# Patient Record
Sex: FEMALE | Race: WHITE | NOT HISPANIC OR LATINO | Employment: OTHER | URBAN - METROPOLITAN AREA
[De-identification: names, ages, dates, MRNs, and addresses within clinical notes are randomized per-mention and may not be internally consistent; named-entity substitution may affect disease eponyms.]

---

## 2017-03-07 ENCOUNTER — ALLSCRIPTS OFFICE VISIT (OUTPATIENT)
Dept: OTHER | Facility: OTHER | Age: 52
End: 2017-03-07

## 2017-03-13 ENCOUNTER — GENERIC CONVERSION - ENCOUNTER (OUTPATIENT)
Dept: OTHER | Facility: OTHER | Age: 52
End: 2017-03-13

## 2017-03-14 LAB — PLEASE NOTE (HISTORICAL): NORMAL

## 2017-03-15 LAB — FECAL OCCULT BLOOD DIAGNOSTIC (HISTORICAL): NEGATIVE

## 2017-03-16 ENCOUNTER — GENERIC CONVERSION - ENCOUNTER (OUTPATIENT)
Dept: OTHER | Facility: OTHER | Age: 52
End: 2017-03-16

## 2017-03-16 DIAGNOSIS — Z12.31 ENCOUNTER FOR SCREENING MAMMOGRAM FOR MALIGNANT NEOPLASM OF BREAST: ICD-10-CM

## 2017-03-16 LAB — FECAL OCCULT BLOOD DIAGNOSTIC (HISTORICAL): NEGATIVE

## 2017-07-05 ENCOUNTER — ALLSCRIPTS OFFICE VISIT (OUTPATIENT)
Dept: OTHER | Facility: OTHER | Age: 52
End: 2017-07-05

## 2017-07-07 LAB
LYME 18 KD IGG (HISTORICAL): PRESENT
LYME 23 KD IGG (HISTORICAL): ABNORMAL
LYME 23 KD IGM (HISTORICAL): PRESENT
LYME 28 KD IGG (HISTORICAL): PRESENT
LYME 30 KD IGG (HISTORICAL): PRESENT
LYME 39 KD IGG (HISTORICAL): PRESENT
LYME 39 KD IGM (HISTORICAL): ABNORMAL
LYME 41 KD IGG (HISTORICAL): PRESENT
LYME 41 KD IGM (HISTORICAL): ABNORMAL
LYME 45 KD IGG (HISTORICAL): PRESENT
LYME 58 KD IGG (HISTORICAL): PRESENT
LYME 66 KD IGG (HISTORICAL): PRESENT
LYME 93 KD IGG (HISTORICAL): PRESENT
LYME IGG WB INTERP. (HISTORICAL): POSITIVE
LYME IGG/IGM AB (HISTORICAL): 2.55 ISR (ref 0–0.9)
LYME IGM (HISTORICAL): <0.8 INDEX (ref 0–0.79)
LYME IGM WB INTERP. (HISTORICAL): NEGATIVE

## 2017-11-06 LAB
CHOLEST SERPL-MCNC: 248 MG/DL (ref 100–199)
CHOLEST/HDLC SERPL: 6.4 RATIO UNITS (ref 0–4.4)
HDLC SERPL-MCNC: 39 MG/DL
LDLC SERPL CALC-MCNC: 149 MG/DL (ref 0–99)
TRIGL SERPL-MCNC: 300 MG/DL (ref 0–149)
VLDLC SERPL CALC-MCNC: 60 MG/DL (ref 5–40)

## 2017-11-13 ENCOUNTER — ALLSCRIPTS OFFICE VISIT (OUTPATIENT)
Dept: OTHER | Facility: OTHER | Age: 52
End: 2017-11-13

## 2017-11-14 NOTE — PROGRESS NOTES
Assessment    1  Hyperlipidemia (272 4) (E78 5)   2  Hypertension, essential (401 9) (I10)   3  Acute URI (465 9) (J06 9)    Plan  Acute URI    · PredniSONE 10 MG Oral Tablet; TAKE 4 TABLETS DAILY FOR 2 DAYS,3 TABLETSDAILY FOR 2 DAYS, 2 TABLETS DAILY FOR 2 DAYS AND 1 TABLET DAILY FOR 2DAYS, THEN STOP  Hypertension, essential    · (1) CBC/PLT/DIFF; Status:Active; Requested for:13Apr2018;    · (1) COMPREHENSIVE METABOLIC PANEL; Status:Active; Requested for:13Apr2018;    · (1) LIPID PANEL, FASTING; Status:Active; Requested for:13Apr2018;    · (1) TSH; Status:Active; Requested for:13Apr2018;    A/p  1   elevated blood pressure :  great with the weight loss  this remains great with out meds  2  hyperlipidemia: please watch your diet! will milton this again in 6 months  colestoff and diet  LDL better but now trigs are elevted  please try to find some balance  3  URI: please take the prednisone and increase fluids  call if this is not improving  rto 6 months/ labs prior  flu and ekg today  COLON DUE - 03/2018 zbigniew due - 3/15/2017   has script and will get today pap due - 03/2018         rto in 6 months with labs prior   **cs and lab slip   (LC) Occult Blood, Fecal, IA; Status:Active - Retrospective By Protocol Authorization; Requested for:07Mar2017;  Perform:[facility]; MAKENNA:03DMV1611; Last Updated Andrea Melendez; 3/7/2017 8:56:34 AM;Ordered; For:Colon cancer screening; Ordered By:Birney, Arna Simmonds;  PEAK FLOW- POC; Status:Resulted - Requires Verification,Retrospective By Protocol Authorization;   Done: 04WTI7364 12:00AM Due:10Mar2017; Last Updated Andrea Melendez; 3/7/2017 8:56:31 AM;Ordered; Today; For:Asthma; Ordered By:Birney, Arna Simmonds;   Chief Complaint  pt  presents in the office today for a 6 month recheck for her asthma, sleep apnea, and HTN  DUE - 03/2018due - 3/15/2017due - 03/2018      History of Present Illness  Here today to milton on several chronic issues  not checking he BP when she is not here and is not taking any meds for thisgot back from [facility] on vacation and got her labs done the day she returned   was eating out of control; while she was there  BEEN DEALING WITH A LOT OF SINUS PRESSURE AND CONGESTION AND R EAR PAIN AND PRESSURE  HAS A COUGH THAT IS WORSE AT NIGHT  NO FEVER AND IS NOT TREATING This  HAS BEEN GOING ON FOR AT LEAST 2 WEEKS  USING THE TREADMILL  Review of Systems   Constitutional: No fever, no chills, feels well, no tiredness, no recent weight gain or weight loss  ENT: as noted in HPI  Cardiovascular: No complaints of slow heart rate, no fast heart rate, no chest pain, no palpitations, no leg claudication, no lower extremity edema  Respiratory: No complaints of shortness of breath, no wheezing, no cough, no SOB on exertion, no orthopnea, no PND  Gastrointestinal: No complaints of abdominal pain, no constipation, no nausea or vomiting, no diarrhea, no bloody stools  Genitourinary: No complaints of dysuria, no incontinence, no pelvic pain, no dysmenorrhea, no vaginal discharge or bleeding  Active Problems  1  Allergic rhinitis (477 9) (J30 9)   2  Asthma (493 90) (J45 909)   3  Colon cancer screening (V76 51) (Z12 11)   4  History of allergy (V15 09) (Z88 9)   5  Hyperlipidemia (272 4) (E78 5)   6  Hypertension, essential (401 9) (I10)   7  Sleep apnea (780 57) (G47 30)    Past Medical History  1  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)    Surgical History  1  History of  Section   2  History of Excision Of Neuroma Of Right Thumb   3  History of Hand Incision Tendon Sheath Of A Finger   4  History of Neuroplasty Median Nerve At Carpal Tunnel    Family History  Mother    1  Denied: Family history of drug abuse   2  Family history of malignant neoplasm of cervix (V16 49) (Z80 49)   3  Denied: Family history of Mental health problem   4  Family history of Mother  At Age ___  Father    11  Denied: Family history of drug abuse   6   Denied: Family history of Mental health problem  Aunt    7  Family history of cerebral aneurysm (V17 1) (Z82 49)  Family History    8  Family history of Adopted child    The family history was reviewed and updated today  Social History     · Alcohol use (V49 89) (Z78 9)   · Denied: History of Alcohol Use (History)   · Denied: History of Drug Use   · Former smoker (V15 82) (Z68 467)   · Denied: History of Never A Smoker   · No caffeine use   · Rarely consumes alcohol (V49 89) (Z78 9)   · Uses Safety Equipment - Seatbelts  The social history was reviewed and updated today  The social history was reviewed and is unchanged  Current Meds   1  Cholest Off Complete TABS; TAKE AS DIRECTED; Therapy: (Recorded:18Apr2016) to Recorded   2  Flovent  MCG/ACT Inhalation Aerosol; INHALE 2 PUFFS TWICE DAILY  RINSE MOUTH AFTER USE; Therapy: 98RNL1386 to (Last Rx:21Jan2016)  Requested for: 21Jan2016 Ordered   3  Green Tea Extract CAPS; TAKE 2 CAPSULE Daily; Therapy: ([phone]) to Recorded   4  Gummi Bear Multivitamin/Min CHEW; take 2 tablet daily; Therapy: (Recorded:39Qxw5925) to Recorded   5  LORazepam 0 5 MG Oral Tablet; TAKE 1 TO 2 TABLETS UP TO 3 TIMES A DAY AS NEEDED; Therapy: 51MAT1946 to [phone])  Requested for: 72VXZ6296; Last Rx:19Oct2017 Ordered   6  Nasonex 50 MCG/ACT Nasal Suspension; USE 2 SPRAYS IN EACH NOSTRIL ONCE DAILY after bathing  Requested for: 62Aqz3356; Last Rx:20May2015 Ordered   7  Ocean Nasal Spray 0 65 % Nasal Solution; Irrigate your nose with 2 sprays twice a day; Therapy: 67JVZ4240 to (Last Rx:20May2015) Ordered   8  ProAir  (90 Base) MCG/ACT Inhalation Aerosol Solution; USE 2 PUFFS AS NEEDED FOR A RESCUE INHALER ONLY, NO MORE THAN 4 TIMESA DAY; Therapy: 39GKD0633 to (Evaluate:22Nov2017)  Requested for: 09TAA2687; Last Rx:19Oct2017 Ordered   9  Slow Magnesium/Calcium  MG TBEC; Therapy: (Recorded:18Apr2016) to Recorded    Allergies  1  No Known Drug Allergies  2  Grass   3  Other   4  Trees    Vitals  Vital Signs    Recorded: 83RVK8447 08:14AM   Heart Rate 80   Respiration 16   Systolic 684   Diastolic 80   Height 5 ft 1 5 in   Weight 178 lb    BMI Calculated 33 09   BSA Calculated 1 81       Physical Exam   Constitutional  General appearance: No acute distress, well appearing and well nourished  Ears, Nose, Mouth, and Throat  Otoscopic examination: Abnormal  -- R tm with kailyn fluid  L tm is clear  Nasal mucosa, septum, and turbinates: Abnormal  -- boggy with serous  Oropharynx: Normal with no erythema, edema, exudate or lesions  Pulmonary  Auscultation of lungs: Clear to auscultation  Cardiovascular  Auscultation of heart: Normal rate and rhythm, normal S1 and S2, without murmurs  Carotid pulses: Normal    Lymphatic  Palpation of lymph nodes in neck: No lymphadenopathy  Skin  Skin and subcutaneous tissue: Normal without rashes or lesions  Psychiatric  Orientation to person, place, and time: Normal    Mood and affect: Normal          Health Management  Colon cancer screening   (LC) Occult Blood, Fecal, IA; every 1 year; Last 29IJP5119; Next Due: 61EPU0375; Active  History of Encounter for routine gynecological examination   (1) THIN PREP PAP WITH IMAGING; every 2 years; Last 87EHL8417; Next Due: 09GLT0152; Active  History of screening mammography   * MAMMO SCREENING BILATERAL W CAD; every 1 year; Last 63GYN8326; Next Due: 63UJJ4739; Overdue    Signatures   Electronically signed by : Aurea Morin; Nov 13 2017  8:45AM EST                       (Author)    Electronically signed by : Angella Mccurdy DO; Nov 13 2017  8:54PM EST

## 2017-11-21 ENCOUNTER — HOSPITAL ENCOUNTER (OUTPATIENT)
Dept: MAMMOGRAPHY | Facility: CLINIC | Age: 52
Discharge: HOME | End: 2017-11-21
Payer: COMMERCIAL

## 2017-11-21 DIAGNOSIS — Z12.31 ENCOUNTER FOR SCREENING MAMMOGRAM FOR MALIGNANT NEOPLASM OF BREAST: ICD-10-CM

## 2017-11-21 PROCEDURE — G0202 SCR MAMMO BI INCL CAD: HCPCS

## 2017-11-21 PROCEDURE — 77063 BREAST TOMOSYNTHESIS BI: CPT

## 2017-12-01 ENCOUNTER — GENERIC CONVERSION - ENCOUNTER (OUTPATIENT)
Dept: OTHER | Facility: OTHER | Age: 52
End: 2017-12-01

## 2017-12-01 ENCOUNTER — ALLSCRIPTS OFFICE VISIT (OUTPATIENT)
Dept: OTHER | Facility: OTHER | Age: 52
End: 2017-12-01

## 2017-12-01 LAB
BILIRUB UR QL STRIP: NORMAL
CLARITY UR: NORMAL
COLOR UR: YELLOW
GLUCOSE (HISTORICAL): NORMAL
HGB UR QL STRIP.AUTO: NORMAL
KETONES UR STRIP-MCNC: NORMAL MG/DL
LEUKOCYTE ESTERASE UR QL STRIP: NORMAL
NITRITE UR QL STRIP: NORMAL
PH UR STRIP.AUTO: 5 [PH]
PROT UR STRIP-MCNC: NORMAL MG/DL
SP GR UR STRIP.AUTO: 1.03
UROBILINOGEN UR QL STRIP.AUTO: 0.2

## 2017-12-05 LAB
CULTURE RESULT (HISTORICAL): ABNORMAL
MISCELLANEOUS LAB TEST RESULT (HISTORICAL): ABNORMAL

## 2018-01-12 VITALS
RESPIRATION RATE: 16 BRPM | DIASTOLIC BLOOD PRESSURE: 80 MMHG | WEIGHT: 178 LBS | HEIGHT: 62 IN | HEART RATE: 80 BPM | SYSTOLIC BLOOD PRESSURE: 124 MMHG | BODY MASS INDEX: 32.76 KG/M2

## 2018-01-12 NOTE — PROGRESS NOTES
Assessment    1  Former smoker (V15 82) (M06 919)   · quit in 1986   2  Asthma (493 90) (J45 909)   3  Premenstrual tension syndrome (625 4) (N94 3)   4  Blood pressure elevated (796 2) (I10)   5  Hyperlipidemia (272 4) (E78 5)   6  Sleep apnea (780 57) (G47 30)   7  Colon cancer screening (V76 51) (Z12 11)   8  Cervical strain (847 0) (S16 1XXA)    Plan  Asthma    · Flovent  MCG/ACT Inhalation Aerosol; INHALE 2 PUFFS TWICE DAILY  RINSE MOUTH AFTER USE   · ProAir  (90 Base) MCG/ACT Inhalation Aerosol Solution; Inhale 2 puffs  as needed for a rescue inhaler only  No more than 4 times daily   · PEAK FLOW- POC; Status:Complete - Retrospective By Protocol Authorization;   Done:  73MJR6919 01:34PM  Cervical strain    · *1 - [facility]  Consult  Status: Hold For -  Scheduling  Requested for: 05GMR4316  Care Summary provided  : Yes  Hyperlipidemia    · Atorvastatin Calcium 10 MG Oral Tablet (Lipitor); TAKE 1 TABLET AT BEDTIME   · (1) COMPREHENSIVE METABOLIC PANEL; Status:Active; Requested for:94Gjr8893;    · (1) LIPID PANEL, FASTING; Status:Active; Requested for:27Dfu2596;    · 1 - Jaiden WESTON, Ioana Ku  (Gastroenterology) Physician Referral  Consult  Status: Active   Requested for: 83YQB2175  Care Summary provided  : Yes  Need for prophylactic vaccination and inoculation against influenza    · Fluzone Quadrivalent Intramuscular Suspension  Premenstrual tension syndrome    · LORazepam 0 5 MG Oral Tablet; TAKE 1 TO 2 TABLETS BY MOUTH UP TO 3  TIMES A DAY AS NEEDED FOR PMS  Sleep apnea    · 2 - Melisa Franks MD  (Sleep Specialist) Physician Referral  Consult  Status: Hold For -  Scheduling  Requested for: 80HWQ3360  Care Summary provided  : Yes                #1 for asthma we will resume the flovent because of costs   we will milton this in 3 months    #2 for PMS she will take the Ativan as she needs it and see if this helps bring her blood pressure down    #3 for elevated blood pressure :  please continue checking your bp at home  #4 for her really high cholesterol We will try the  lipitor and have you take this once a day  please call if you have any questions about you feel with this  we will milton the blood work again in 3 months    5  cervical strain: Please start yoga and please do the physical therapy  Next mammogram due February 12, 2016 her later  Next Pap test due February 2016  colon info given    3 month milton with labs prior      Chief Complaint  Pt presents to the office today for her 3 mo recheck on asthma, PMS, elevated BP and cholesterol  B/W DONE 12/15/15  COLON DUE NOW  MAMMO 02/12/16  PAP 02/11/16      History of Present Illness  Here today to milton on several chronic issues  Had been started on Simvastatin last ov and states that she did not eel good on this   can not really articulate what she was feeling ( denies myalgia)  Stopped it shortly after starting it and is now taking the cholestoff   states that she does have issues with snoring and witnessed apnea  Does check her BP at home and is getting good readings over all  Ativan is working well for her PMS which is less frequently as as well    Gets neck stiffness and pain and L shoulder gets numb on occasion  Review of Systems    Constitutional: No fever, no chills, feels well, no tiredness, no recent weight gain or weight loss  Cardiovascular: No complaints of slow heart rate, no fast heart rate, no chest pain, no palpitations, no leg claudication, no lower extremity edema  Respiratory: No complaints of shortness of breath, no wheezing, no cough, no SOB on exertion, no orthopnea, no PND  Musculoskeletal: as noted in HPI  Integumentary: No complaints of skin rash or lesions, no itching, no skin wounds, no breast pain or lump     Neurological: No complaints of headache, no confusion, no convulsions, no numbness, no dizziness or fainting, no tingling, no limb weakness, no difficulty walking  Psychiatric: Not suicidal, no sleep disturbance, no anxiety or depression, no change in personality, no emotional problems  Active Problems    1  Allergic rhinitis (477 9) (J30 9)   2  Asthma (493 90) (J45 909)   3  Blood pressure elevated (796 2) (I10)   4  Colon cancer screening (V76 51) (Z12 11)   5  Encounter for routine gynecological examination (V72 31) (Z01 419)   6  Encounter for screening mammogram for malignant neoplasm of breast (V76 12)   (Z12 31)   7  Encounter for therapeutic drug monitoring (V58 83) (Z51 81)   8  History of allergy (V15 09) (Z88 9)   9  Hyperlipidemia (272 4) (E78 5)   10  Long term use of drug (V58 69) (Z79 899)   11  Need for prophylactic vaccination and inoculation against influenza (V04 81) (Z23)   12  Premenstrual tension syndrome (625 4) (N94 3)    Past Medical History    1  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)    Family History    1  Family history of malignant neoplasm of cervix (V16 49) (Z80 49)   2  Family history of Mother  At Age ___    3  Family history of Adopted child    Social History    · Alcohol use (V49 89) (F10 99)   · Denied: History of Alcohol Use (History)   · Daily caffeine consumption, 2-3 servings a day   · Denied: History of Drug Use   · Former smoker (L75 35) (J97 916)   · Denied: History of Never A Smoker   · Rarely consumes alcohol (V49 89) (Z78 9)   · Uses Safety Equipment - Seatbelts    Current Meds   1  Blood Pressure Monitor Device; Check blood pressure at different times daily, once daily; Therapy: 26WHE4425 to (Last Rx:03Zkv9827) Ordered   2  Gummi Bear Multivitamin/Min CHEW; take 2 tablet daily; Therapy: (Recorded:48Hcg4150) to Recorded   3  LORazepam 0 5 MG Oral Tablet; TAKE 1 TO 2 TABLETS BY MOUTH UP TO 3 TIMES A   DAY AS NEEDED FOR PMS; Therapy: 45YAK9613 to ([id number])  Requested for: 67JSQ0981; Last   Rx:11Bcq6732 Ordered   4   Nasonex 50 MCG/ACT Nasal Suspension; USE 2 SPRAYS IN EACH NOSTRIL ONCE   DAILY after bathing  Requested for: 16Eds8003; Last Rx:20May2015 Ordered   5  Ocean Nasal Spray 0 65 % Nasal Solution; Irrigate your nose with 2 sprays twice a day; Therapy: 47SMN2362 to (Last Rx:20May2015) Ordered   6  ProAir  (90 Base) MCG/ACT Inhalation Aerosol Solution; Inhale 2 puffs as   needed for a rescue inhaler only  No more than 4 times daily  Requested for:   97JRO2513; Last Rx:20May2015 Ordered    Allergies    1  No Known Drug Allergies    2  Grass   3  Other   4  Trees    Vitals  Vital Signs [Data Includes: Current Encounter]    Recorded: Y4105077 01:33PM   Heart Rate 76, R Radial   Respiration 16   Respiration Quality Norm   Systolic 675, RUE, Sitting   Diastolic 76, RUE, Sitting   Height 5 ft 1 5 in   Weight 186 lb 7 04 oz   BMI Calculated 34 66   BSA Calculated 1 84     Physical Exam    Constitutional   General appearance: No acute distress, well appearing and well nourished  Pulmonary   Auscultation of lungs: Clear to auscultation  Cardiovascular   Auscultation of heart: Normal rate and rhythm, normal S1 and S2, without murmurs  Carotid pulses: Normal     Lymphatic   Palpation of lymph nodes in neck: No lymphadenopathy  Musculoskeletal   Gait and station: Normal     Inspection/palpation of joints, bones, and muscles: Abnormal   decreased rom cervical spine due to general stiffness  pain to palpation over the L trap area  Skin   Skin and subcutaneous tissue: Normal without rashes or lesions      Psychiatric   Orientation to person, place, and time: Normal     Mood and affect: Normal          Results/Data  Encounter Results   PHQ-2 Adult Depression Screening 04UMW3827 01:35PM User, Chari     Test Name Result Flag Reference   PHQ-2 Adult Depression Score 0     Q1: 0, Q2: 0   PHQ-2 Adult Depression Screening Negative       PEAK FLOW- POC 86VNQ1932 01:34PM Marilyn Gallagher     Test Name Result Flag Reference   Peak Flow [address] Management  Encounter for routine gynecological examination   (every) THINPREP PAP RFX HR HPV; every 2 years; Last 54HWD5833; Next Due: 01Hkm8424; Near  Due  Encounter for screening mammogram for malignant neoplasm of breast   Digital Bilateral Screening Mammogram With CAD; every 1 year; Last 61RHI8508;  Next Due:  61Sud3006; Near Due    Signatures   Electronically signed by : Hawk Buchanan; Jan 18 2016  2:21PM EST                       (Author)    Electronically signed by : Hayden Grayson DO; Jan 18 2016  8:44PM EST

## 2018-01-13 VITALS
WEIGHT: 175.5 LBS | BODY MASS INDEX: 32.3 KG/M2 | RESPIRATION RATE: 16 BRPM | SYSTOLIC BLOOD PRESSURE: 160 MMHG | HEIGHT: 62 IN | DIASTOLIC BLOOD PRESSURE: 90 MMHG | TEMPERATURE: 98.4 F | HEART RATE: 84 BPM

## 2018-01-14 NOTE — PROGRESS NOTES
Assessment    1  Blood pressure elevated (796 2) (I10)   2  Hyperlipidemia (272 4) (E78 5)    Plan  Blood pressure elevated, Hyperlipidemia    · [facility]  Physician Referral  Consult  Status: Active  Requested for:  80NFU9071  Care Summary provided  : Yes  Colon cancer screening    · 2 - Kelsey Wilkins MD, Joan Simons  (Gastroenterology) Physician Referral  Consult  Status:  Canceled  Care Summary provided  : Yes  Hyperlipidemia    · 1 Neida Gamble MD, Floyd Self  (Gastroenterology) Physician Referral  Consult  Status: Active   Requested for: 79AII4374  Care Summary provided  : Yes  Sleep apnea    · 2 - Kelli Ku MD, Addie Carter  (Sleep Specialist) Physician Referral  Consult  Status: Active   Requested for: 45RJT4458  Care Summary provided  : Yes        A/P  1  elevated chol and BP:  we will attack this with lifestyle  We will refer you to dietary  counseling and you will get on the elliptical, We will have you come back in 6 weeks  if you are not making progress toward goal we will refer you to the lipid clinic  Chief Complaint  Pt presents to the office today for c/o not being able to tolerate the cholesterol medication  COLON DUE NOW and patient was given an order for this previously  Braydon Shabazz is due after 02/12/16  PAP is due after 02/11/16      History of Present Illness  HPI: Here today to discuss her lipitor  Gets really flushed, and weak sensation  Only gets this when she is on the meds  Got this way on the simvastatin and now on the lipitor  when we decreased to 1/2 pill got a little better but is still there  wants to stop this  knows she needs to get on track with diet and exercise  has been able to do this in the past and wants to try this again  Review of Systems    Constitutional: No fever, no chills, feels well, no tiredness, no recent weight gain or loss     Cardiovascular: no complaints of slow or fast heart rate, no chest pain, no palpitations, no leg claudication or lower extremity edema  Respiratory: no complaints of shortness of breath, no wheezing, no dyspnea on exertion, no orthopnea or PND  Musculoskeletal: no complaints of arthralgia, no myalgia, no joint swelling or stiffness, no limb pain or swelling  Integumentary: no complaints of skin rash or lesion, no itching or dry skin, no skin wounds  Active Problems    1  Allergic rhinitis (477 9) (J30 9)   2  Asthma (493 90) (J45 909)   3  Blood pressure elevated (796 2) (I10)   4  Cervical strain (847 0) (S16 1XXA)   5  Colon cancer screening (V76 51) (Z12 11)   6  Encounter for routine gynecological examination (V72 31) (Z01 419)   7  Encounter for screening mammogram for malignant neoplasm of breast (V76 12)   (Z12 31)   8  Encounter for therapeutic drug monitoring (V58 83) (Z51 81)   9  History of allergy (V15 09) (Z88 9)   10  Hyperlipidemia (272 4) (E78 5)   11  Long term use of drug (V58 69) (Z79 899)   12  Need for prophylactic vaccination and inoculation against influenza (V04 81) (Z23)   13  Premenstrual tension syndrome (625 4) (N94 3)   14  Sleep apnea (780 57) (G47 30)    Past Medical History    1  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)    Family History    1  Family history of malignant neoplasm of cervix (V16 49) (Z80 49)   2  Family history of Mother  At Age ___    3  Family history of Adopted child    Social History    · Alcohol use (V49 89) (F10 99)   · Denied: History of Alcohol Use (History)   · Daily caffeine consumption, 2-3 servings a day   · Denied: History of Drug Use   · Former smoker (V15 82) (J60 518)   · quit in [facility] 1   · Denied: History of Never A Smoker   · Rarely consumes alcohol (V49 89) (Z78 9)   · Uses Safety Equipment - Seatbelts    Surgical History    1  History of  Section   2  History of Excision Of Neuroma Of Right Thumb   3  History of Hand Incision Tendon Sheath Of A Finger   4   History of Neuroplasty Median Nerve At Carpal Tunnel    Current Meds   1  Blood Pressure Monitor Device; Check blood pressure at different times daily, once daily; Therapy: 96DRT7832 to (Last Rx:51Pbm2300) Ordered   2  Flovent  MCG/ACT Inhalation Aerosol; INHALE 2 PUFFS TWICE DAILY  RINSE   MOUTH AFTER USE; Therapy: 27FDV2653 to (Last Rx:21Jan2016)  Requested for: 21Jan2016 Ordered   3  Gummi Bear Multivitamin/Min CHEW; take 2 tablet daily; Therapy: (Recorded:11Trl2753) to Recorded   4  LORazepam 0 5 MG Oral Tablet; TAKE 1 TO 2 TABLETS BY MOUTH UP TO 3 TIMES A   DAY AS NEEDED FOR PMS; Therapy: 95TYE8551 to (Evaluate:23Jan2016)  Requested for: 92JNC0574; Last   Rx:18Jan2016 Ordered   5  Nasonex 50 MCG/ACT Nasal Suspension; USE 2 SPRAYS IN EACH NOSTRIL ONCE   DAILY after bathing  Requested for: 80Vjq0589; Last Rx:93Dft2804 Ordered   6  Ocean Nasal Spray 0 65 % Nasal Solution; Irrigate your nose with 2 sprays twice a day; Therapy: 76DJY0817 to (Last Rx:20May2015) Ordered   7  ProAir  (90 Base) MCG/ACT Inhalation Aerosol Solution; Inhale 2 puffs as   needed for a rescue inhaler only  No more than 4 times daily  Requested for:   86DHV1671; Last Rx:18Jan2016 Ordered    Allergies    1  No Known Drug Allergies    2  Grass   3  Other   4  Trees    Vitals   Recorded: 70QQA1804 09:10AM   Heart Rate 100   Respiration 16   Systolic 493   Diastolic 90   Height 5 ft 1 5 in   Weight 186 lb 6 08 oz   BMI Calculated 34 65   BSA Calculated 1 84   O2 Saturation 98     Physical Exam    Constitutional   General appearance: No acute distress, well appearing and well nourished  Pulmonary   Auscultation of lungs: Clear to auscultation  Cardiovascular   Auscultation of heart: Normal rate and rhythm, normal S1 and S2, without murmurs  Carotid pulses: Normal     Lymphatic   Palpation of lymph nodes in neck: No lymphadenopathy  Skin   Skin and subcutaneous tissue: Normal without rashes or lesions      Psychiatric   Orientation to person, place, and time: Normal     Mood and affect: Normal          Future Appointments    Date/Time Provider Specialty Site   03/08/2016 08:15 AM Mohan Proctor, [address] [facility] [place]   04/18/2016 07:45 AM Mohan Proctor, [address] [facility]     Signatures   Electronically signed by : BRITTNEY Palomino; Jan 26 2016  9:42AM EST                       (Author)    Electronically signed by : Yanira James DO; Jan 26 2016  4:00PM EST

## 2018-01-15 VITALS
DIASTOLIC BLOOD PRESSURE: 72 MMHG | SYSTOLIC BLOOD PRESSURE: 126 MMHG | BODY MASS INDEX: 31.1 KG/M2 | HEIGHT: 62 IN | RESPIRATION RATE: 16 BRPM | WEIGHT: 169 LBS | HEART RATE: 80 BPM

## 2018-01-16 NOTE — PROGRESS NOTES
Assessment    1  Encounter for routine gynecological examination (V72 31) (Z01 419)    Plan  Encounter for routine gynecological examination    · Follow Up in 2 Years Evaluation and Treatment  Follow-up  Status: Hold For - Scheduling   Requested for: 10HYF0108   · (1) THIN PREP PAP WITH IMAGING; Status:Active; Requested for:15Mar2016;   Maturation index required? : No  : postmenopausal  HPV? : if ASCUS     A/P  1/ gyn: we have done your pap and we will put a card in the mail  you will bee due back again in 2 years  Colonoscopy is due and she was given an order for this  Mammogram is due after 02/12/2016 and she was given an order for this today     Chief Complaint  Patient presents to the office today for gyn exam and pap test  Last pap test was done 02/11/2014  Colonoscopy is due and she was given an order for this  Mammogram is due after 02/12/2016 and she was given an order for this today  Flu shot is up to date for 3841-3666 year  History of Present Illness  HM, Adult Female: The patient is being seen for a gynecology evaluation  General Health:   Reproductive health: the patient is postmenopausal   Patient reports being about 9 when she first got her period  Screening:   HPI: Here today for gyn  has not had a period since ablation several years  will get her mammo today          Review of Systems    Constitutional: No fever, no chills, feels well, no tiredness, no recent weight gain or weight loss  Cardiovascular: No complaints of slow heart rate, no fast heart rate, no chest pain, no palpitations, no leg claudication, no lower extremity edema  Respiratory: No complaints of shortness of breath, no wheezing, no cough, no SOB on exertion, no orthopnea, no PND  Gastrointestinal: No complaints of abdominal pain, no constipation, no nausea or vomiting, no diarrhea, no bloody stools     Genitourinary: No complaints of dysuria, no incontinence, no pelvic pain, no dysmenorrhea, no vaginal discharge or bleeding  Musculoskeletal: No complaints of arthralgias, no myalgias, no joint swelling or stiffness, no limb pain or swelling  Integumentary: No complaints of skin rash or lesions, no itching, no skin wounds, no breast pain or lump  Neurological: No complaints of headache, no confusion, no convulsions, no numbness, no dizziness or fainting, no tingling, no limb weakness, no difficulty walking  Psychiatric: Not suicidal, no sleep disturbance, no anxiety or depression, no change in personality, no emotional problems  Active Problems    1  Allergic rhinitis (477 9) (J30 9)   2  Asthma (493 90) (J45 909)   3  Blood pressure elevated (401 9) (I10)   4  Cervical strain (847 0) (S16 1XXA)   5  Colon cancer screening (V76 51) (Z12 11)   6  Encounter for routine gynecological examination (V72 31) (Z01 419)   7  Encounter for screening mammogram for malignant neoplasm of breast (V76 12)   (Z12 31)   8  Encounter for therapeutic drug monitoring (V58 83) (Z51 81)   9  History of allergy (V15 09) (Z88 9)   10  Hyperlipidemia (272 4) (E78 5)   11  Long term use of drug (V58 69) (Z79 899)   12  Need for prophylactic vaccination and inoculation against influenza (V04 81) (Z23)   13  Premenstrual tension syndrome (625 4) (N94 3)   14   Sleep apnea (780 57) (G47 30)    Past Medical History    · 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)    Surgical History    · History of  Section   · History of Excision Of Neuroma Of Right Thumb   · History of Hand Incision Tendon Sheath Of A Finger   · History of Neuroplasty Median Nerve At Carpal Tunnel    Family History    · Family history of malignant neoplasm of cervix (V16 49) (Z80 49)   · Family history of Mother  At Age ___    · Family history of Adopted child    Social History    · Alcohol use (V49 89) (Z78 9)   · 1 glass once a year   · Denied: History of Alcohol Use (History)   · Denied: History of Drug Use   · Former smoker (V15 82) (W16 159)   · quit in 1986   · Denied: History of Never A Smoker   · No caffeine use   · Rarely consumes alcohol (V49 89) (Z78 9)   · Uses Safety Equipment - Seatbelts    Current Meds   1  Flovent  MCG/ACT Inhalation Aerosol; INHALE 2 PUFFS TWICE DAILY  RINSE   MOUTH AFTER USE; Therapy: 10PPB2159 to (Last Rx:21Jan2016)  Requested for: 21Jan2016 Ordered   2  Gummi Bear Multivitamin/Min CHEW; take 2 tablet daily; Therapy: (Recorded:57Jra4004) to Recorded   3  LORazepam 0 5 MG Oral Tablet; TAKE 1 TO 2 TABLETS BY MOUTH UP TO 3 TIMES A   DAY AS NEEDED FOR PMS; Therapy: 85TGB7790 to (Evaluate:23Jan2016)  Requested for: 72CNB0161; Last   Rx:18Jan2016 Ordered   4  Nasonex 50 MCG/ACT Nasal Suspension; USE 2 SPRAYS IN EACH NOSTRIL ONCE   DAILY after bathing  Requested for: 54Rpk8269; Last Rx:20May2015 Ordered   5  Ocean Nasal Spray 0 65 % Nasal Solution; Irrigate your nose with 2 sprays twice a day; Therapy: 37YAL8017 to (Last Rx:20May2015) Ordered   6  ProAir  (90 Base) MCG/ACT Inhalation Aerosol Solution; Inhale 2 puffs as   needed for a rescue inhaler only  No more than 4 times daily  Requested for:   11EMD7680; Last Rx:18Jan2016 Ordered    Allergies    1  No Known Drug Allergies    2  Grass   3  Other   4  Trees    Vitals   Recorded: 62ZYU1247 11:20AM   Heart Rate 88   Respiration 16   Systolic 376   Diastolic 82   Height 5 ft 1 5 in   Weight 176 lb 3 04 oz   BMI Calculated 32 75   BSA Calculated 1 8   O2 Saturation 96     Physical Exam    Constitutional   General appearance: No acute distress, well appearing and well nourished  Neck   Neck: Supple, symmetric, trachea midline, no masses  Pulmonary   Auscultation of lungs: Clear to auscultation  Cardiovascular   Auscultation of heart: Normal rate and rhythm, normal S1 and S2, no murmurs  Chest   Breasts: Normal, no dimpling or skin changes appreciated  Palpation of breasts and axillae: Normal, no masses palpated      Abdomen   Abdomen: Non-tender, no masses  Liver and spleen: No hepatomegaly or splenomegaly  Genitourinary   External genitalia and vagina: Normal, no lesions appreciated  Cervix: Normal, no lesions  Examination of the cervix revealed normal findings  A Pap smear was performed  Uterus: Normal size, no tenderness, no masses  Adnexa/Parametria: Normal, no masses or tenderness  Lymphatic   Palpation of lymph nodes in neck: No lymphadenopathy  Musculoskeletal   Gait and station: Normal     Range of motion: Normal     Skin   Skin and subcutaneous tissue: Normal without rashes or lesions  Psychiatric   Orientation to person, place, and time: Normal     Mood and affect: Normal        Health Management  Encounter for routine gynecological examination   (every) THINPREP PAP RFX HR HPV; every 2 years; Last 74IXZ6481; Next Due:  94IZN5396; Overdue  Encounter for screening mammogram for malignant neoplasm of breast   Digital Bilateral Screening Mammogram With CAD; every 1 year; Last 04ZRE9429; Next  Due: 20Rxl2910;  Overdue    Future Appointments    Date/Time Provider Specialty Site   04/18/2016 07:45 AM Arlon Lanes, [address] [place] [address] FP     Signatures   Electronically signed by : BRITTNEY TREVIZO; Mar 15 2016 11:58AM EST                       (Author)    Electronically signed by : Talya So DO; Mar 15 2016  1:30PM EST

## 2018-01-22 VITALS
DIASTOLIC BLOOD PRESSURE: 80 MMHG | SYSTOLIC BLOOD PRESSURE: 122 MMHG | HEART RATE: 78 BPM | HEIGHT: 61 IN | RESPIRATION RATE: 14 BRPM | TEMPERATURE: 98.2 F | BODY MASS INDEX: 33.79 KG/M2 | WEIGHT: 179 LBS

## 2018-04-24 DIAGNOSIS — J45.20 MILD INTERMITTENT ASTHMA WITHOUT COMPLICATION: Primary | ICD-10-CM

## 2019-04-22 ENCOUNTER — TELEPHONE (OUTPATIENT)
Dept: FAMILY MEDICINE CLINIC | Facility: CLINIC | Age: 54
End: 2019-04-22

## 2019-04-22 DIAGNOSIS — Z13.29 SCREENING FOR THYROID DISORDER: ICD-10-CM

## 2019-04-22 DIAGNOSIS — J45.20 MILD INTERMITTENT ASTHMA WITHOUT COMPLICATION: ICD-10-CM

## 2019-04-22 DIAGNOSIS — E78.2 HYPERLIPEMIA, MIXED: Primary | ICD-10-CM

## 2019-04-22 DIAGNOSIS — Z13.228 SCREENING FOR METABOLIC DISORDER: ICD-10-CM

## 2019-04-22 DIAGNOSIS — Z13.0 SCREENING, ANEMIA, DEFICIENCY, IRON: ICD-10-CM

## 2019-04-22 RX ORDER — ALBUTEROL SULFATE 90 UG/1
2 AEROSOL, METERED RESPIRATORY (INHALATION) EVERY 4 HOURS PRN
Qty: 8.5 INHALER | Refills: 1 | Status: SHIPPED | OUTPATIENT
Start: 2019-04-22 | End: 2020-04-23

## 2019-06-12 DIAGNOSIS — Z12.39 SCREENING FOR MALIGNANT NEOPLASM OF BREAST: Primary | ICD-10-CM

## 2019-06-20 ENCOUNTER — OFFICE VISIT (OUTPATIENT)
Dept: FAMILY MEDICINE CLINIC | Facility: CLINIC | Age: 54
End: 2019-06-20
Payer: COMMERCIAL

## 2019-06-20 VITALS
DIASTOLIC BLOOD PRESSURE: 80 MMHG | SYSTOLIC BLOOD PRESSURE: 122 MMHG | RESPIRATION RATE: 12 BRPM | HEART RATE: 80 BPM | WEIGHT: 185 LBS | BODY MASS INDEX: 34.93 KG/M2 | HEIGHT: 61 IN

## 2019-06-20 DIAGNOSIS — Z12.11 SCREENING FOR MALIGNANT NEOPLASM OF COLON: ICD-10-CM

## 2019-06-20 DIAGNOSIS — Z12.39 SCREENING FOR MALIGNANT NEOPLASM OF BREAST: ICD-10-CM

## 2019-06-20 DIAGNOSIS — Z00.00 PE (PHYSICAL EXAM), ANNUAL: Primary | ICD-10-CM

## 2019-06-20 PROCEDURE — 99396 PREV VISIT EST AGE 40-64: CPT | Performed by: NURSE PRACTITIONER

## 2020-04-22 DIAGNOSIS — J45.20 MILD INTERMITTENT ASTHMA WITHOUT COMPLICATION: ICD-10-CM

## 2020-08-26 DIAGNOSIS — J45.20 MILD INTERMITTENT ASTHMA WITHOUT COMPLICATION: ICD-10-CM

## 2020-08-27 RX ORDER — ALBUTEROL SULFATE 90 UG/1
AEROSOL, METERED RESPIRATORY (INHALATION)
Qty: 8.5 INHALER | Refills: 1 | Status: SHIPPED | OUTPATIENT
Start: 2020-08-27 | End: 2020-12-20

## 2020-12-19 DIAGNOSIS — J45.20 MILD INTERMITTENT ASTHMA WITHOUT COMPLICATION: ICD-10-CM

## 2020-12-20 DIAGNOSIS — E55.9 VITAMIN D DEFICIENCY: ICD-10-CM

## 2020-12-20 DIAGNOSIS — E78.5 DYSLIPIDEMIA: ICD-10-CM

## 2020-12-20 DIAGNOSIS — I10 HYPERTENSION, ESSENTIAL: Primary | ICD-10-CM

## 2020-12-20 DIAGNOSIS — Z79.899 ENCOUNTER FOR LONG-TERM (CURRENT) USE OF MEDICATIONS: ICD-10-CM

## 2020-12-20 PROBLEM — G47.30 SLEEP APNEA: Status: ACTIVE | Noted: 2020-12-20

## 2020-12-20 PROBLEM — J30.9 ALLERGIC RHINITIS: Status: ACTIVE | Noted: 2020-12-20

## 2020-12-20 RX ORDER — ALBUTEROL SULFATE 90 UG/1
AEROSOL, METERED RESPIRATORY (INHALATION)
Qty: 8.5 INHALER | Refills: 0 | Status: SHIPPED | OUTPATIENT
Start: 2020-12-20 | End: 2021-01-13

## 2020-12-20 NOTE — TELEPHONE ENCOUNTER
Received a request for patient's albuterol inhaler  It is time for her to get her blood work done and a physical  Her inhaler will be sent but ask her to make an appointment with [facility]

## 2021-01-13 DIAGNOSIS — J45.20 MILD INTERMITTENT ASTHMA WITHOUT COMPLICATION: ICD-10-CM

## 2021-01-13 RX ORDER — ALBUTEROL SULFATE 90 UG/1
AEROSOL, METERED RESPIRATORY (INHALATION)
Qty: 8.5 INHALER | Refills: 0 | Status: SHIPPED | OUTPATIENT
Start: 2021-01-13 | End: 2021-03-23 | Stop reason: SDUPTHER

## 2021-02-05 DIAGNOSIS — J45.20 MILD INTERMITTENT ASTHMA WITHOUT COMPLICATION: ICD-10-CM

## 2021-02-06 NOTE — TELEPHONE ENCOUNTER
Patient not seen since 2019   Received a refill request for albuterol     Please call patient and ask her to make an appointment for 30 minutes for recheck and physical   She also has blood work that is already been ordered at the end of December 2020  That should be done before she comes in

## 2021-02-08 RX ORDER — ALBUTEROL SULFATE 90 UG/1
AEROSOL, METERED RESPIRATORY (INHALATION)
Qty: 8.5 INHALER | Refills: 0 | OUTPATIENT
Start: 2021-02-08

## 2021-02-08 NOTE — TELEPHONE ENCOUNTER
Left detailed message to patient  Patient informed, scheduled for 03/16/2021 will get labs 1 weeks prior

## 2021-03-04 LAB
25(OH)D3+25(OH)D2 SERPL-MCNC: 35.5 NG/ML (ref 30–100)
ALBUMIN SERPL-MCNC: 4.1 G/DL (ref 3.8–4.9)
ALBUMIN/GLOB SERPL: 1.8 {RATIO} (ref 1.2–2.2)
ALP SERPL-CCNC: 46 IU/L (ref 39–117)
ALT SERPL-CCNC: 17 IU/L (ref 0–32)
APPEARANCE UR: CLEAR
AST SERPL-CCNC: 19 IU/L (ref 0–40)
BACTERIA URNS QL MICRO: ABNORMAL
BASOPHILS # BLD AUTO: 0.1 X10E3/UL (ref 0–0.2)
BASOPHILS NFR BLD AUTO: 1 %
BILIRUB SERPL-MCNC: 0.2 MG/DL (ref 0–1.2)
BILIRUB UR QL STRIP: NEGATIVE
BUN SERPL-MCNC: 12 MG/DL (ref 6–24)
BUN/CREAT SERPL: 19 (ref 9–23)
CALCIUM SERPL-MCNC: 9.4 MG/DL (ref 8.7–10.2)
CHLORIDE SERPL-SCNC: 101 MMOL/L (ref 96–106)
CHOLEST SERPL-MCNC: 222 MG/DL (ref 100–199)
CO2 SERPL-SCNC: 28 MMOL/L (ref 20–29)
COLOR UR: YELLOW
CREAT SERPL-MCNC: 0.63 MG/DL (ref 0.57–1)
EOSINOPHIL # BLD AUTO: 0.2 X10E3/UL (ref 0–0.4)
EOSINOPHIL NFR BLD AUTO: 3 %
EPI CELLS #/AREA URNS HPF: ABNORMAL /HPF (ref 0–10)
ERYTHROCYTE [DISTWIDTH] IN BLOOD BY AUTOMATED COUNT: 13 % (ref 11.7–15.4)
GLOBULIN SER-MCNC: 2.3 G/DL (ref 1.5–4.5)
GLUCOSE SERPL-MCNC: 98 MG/DL (ref 65–99)
GLUCOSE UR QL: NEGATIVE
HCT VFR BLD AUTO: 41.1 % (ref 34–46.6)
HDLC SERPL-MCNC: 38 MG/DL
HGB BLD-MCNC: 14.3 G/DL (ref 11.1–15.9)
HGB UR QL STRIP: ABNORMAL
IMM GRANULOCYTES # BLD: 0 X10E3/UL (ref 0–0.1)
IMM GRANULOCYTES NFR BLD: 0 %
KETONES UR QL STRIP: NEGATIVE
LDLC SERPL CALC-MCNC: 158 MG/DL (ref 0–99)
LEUKOCYTE ESTERASE UR QL STRIP: ABNORMAL
LYMPHOCYTES # BLD AUTO: 2.5 X10E3/UL (ref 0.7–3.1)
LYMPHOCYTES NFR BLD AUTO: 41 %
MCH RBC QN AUTO: 30.2 PG (ref 26.6–33)
MCHC RBC AUTO-ENTMCNC: 34.8 G/DL (ref 31.5–35.7)
MCV RBC AUTO: 87 FL (ref 79–97)
MICRO URNS: ABNORMAL
MONOCYTES # BLD AUTO: 0.4 X10E3/UL (ref 0.1–0.9)
MONOCYTES NFR BLD AUTO: 7 %
MUCOUS THREADS URNS QL MICRO: PRESENT
NEUTROPHILS # BLD AUTO: 2.9 X10E3/UL (ref 1.4–7)
NEUTROPHILS NFR BLD AUTO: 48 %
NITRITE UR QL STRIP: NEGATIVE
PH UR STRIP: 7 [PH] (ref 5–7.5)
PLATELET # BLD AUTO: 372 X10E3/UL (ref 150–450)
POTASSIUM SERPL-SCNC: 5.1 MMOL/L (ref 3.5–5.2)
PROT SERPL-MCNC: 6.4 G/DL (ref 6–8.5)
PROT UR QL STRIP: NEGATIVE
RBC # BLD AUTO: 4.74 X10E6/UL (ref 3.77–5.28)
RBC #/AREA URNS HPF: ABNORMAL /HPF (ref 0–2)
SL AMB EGFR AFRICAN AMERICAN: 117 ML/MIN/1.73
SL AMB EGFR NON AFRICAN AMERICAN: 101 ML/MIN/1.73
SL AMB VLDL CHOLESTEROL CALC: 26 MG/DL (ref 5–40)
SODIUM SERPL-SCNC: 139 MMOL/L (ref 134–144)
SP GR UR: 1.02 (ref 1–1.03)
TRIGL SERPL-MCNC: 145 MG/DL (ref 0–149)
UROBILINOGEN UR STRIP-ACNC: 0.2 MG/DL (ref 0.2–1)
WBC # BLD AUTO: 6.1 X10E3/UL (ref 3.4–10.8)
WBC #/AREA URNS HPF: ABNORMAL /HPF (ref 0–5)

## 2021-03-23 ENCOUNTER — OFFICE VISIT (OUTPATIENT)
Dept: FAMILY MEDICINE CLINIC | Facility: CLINIC | Age: 56
End: 2021-03-23
Payer: COMMERCIAL

## 2021-03-23 ENCOUNTER — TELEPHONE (OUTPATIENT)
Dept: FAMILY MEDICINE CLINIC | Facility: CLINIC | Age: 56
End: 2021-03-23

## 2021-03-23 VITALS
WEIGHT: 191.3 LBS | HEIGHT: 61 IN | BODY MASS INDEX: 36.12 KG/M2 | RESPIRATION RATE: 17 BRPM | SYSTOLIC BLOOD PRESSURE: 140 MMHG | TEMPERATURE: 98.2 F | DIASTOLIC BLOOD PRESSURE: 84 MMHG | HEART RATE: 74 BPM

## 2021-03-23 DIAGNOSIS — E78.00 HYPERCHOLESTEROLEMIA: ICD-10-CM

## 2021-03-23 DIAGNOSIS — I10 HYPERTENSION, ESSENTIAL: ICD-10-CM

## 2021-03-23 DIAGNOSIS — R31.21 ASYMPTOMATIC MICROSCOPIC HEMATURIA: ICD-10-CM

## 2021-03-23 DIAGNOSIS — J45.20 MILD INTERMITTENT ASTHMA WITHOUT COMPLICATION: ICD-10-CM

## 2021-03-23 DIAGNOSIS — J30.1 SEASONAL ALLERGIC RHINITIS DUE TO POLLEN: ICD-10-CM

## 2021-03-23 DIAGNOSIS — Z12.31 ENCOUNTER FOR SCREENING MAMMOGRAM FOR MALIGNANT NEOPLASM OF BREAST: ICD-10-CM

## 2021-03-23 DIAGNOSIS — Z00.00 ROUTINE GENERAL MEDICAL EXAMINATION AT A HEALTH CARE FACILITY: Primary | ICD-10-CM

## 2021-03-23 DIAGNOSIS — F40.243 FEAR OF FLYING: ICD-10-CM

## 2021-03-23 PROBLEM — E78.5 DYSLIPIDEMIA: Status: RESOLVED | Noted: 2020-12-20 | Resolved: 2021-03-23

## 2021-03-23 PROCEDURE — 99396 PREV VISIT EST AGE 40-64: CPT | Performed by: FAMILY MEDICINE

## 2021-03-23 PROCEDURE — 99214 OFFICE O/P EST MOD 30 MIN: CPT | Performed by: FAMILY MEDICINE

## 2021-03-23 PROCEDURE — 3725F SCREEN DEPRESSION PERFORMED: CPT | Performed by: FAMILY MEDICINE

## 2021-03-23 PROCEDURE — 3008F BODY MASS INDEX DOCD: CPT | Performed by: FAMILY MEDICINE

## 2021-03-23 PROCEDURE — 1036F TOBACCO NON-USER: CPT | Performed by: FAMILY MEDICINE

## 2021-03-23 RX ORDER — ALBUTEROL SULFATE 90 UG/1
2 AEROSOL, METERED RESPIRATORY (INHALATION) AS NEEDED
Qty: 8.5 INHALER | Refills: 0 | Status: SHIPPED | OUTPATIENT
Start: 2021-03-23 | End: 2021-06-01 | Stop reason: SDUPTHER

## 2021-03-23 RX ORDER — LORAZEPAM 0.5 MG/1
0.5 TABLET ORAL AS NEEDED
Qty: 10 TABLET | Refills: 0 | Status: SHIPPED | OUTPATIENT
Start: 2021-03-23 | End: 2022-07-11 | Stop reason: SDUPTHER

## 2021-03-23 NOTE — TELEPHONE ENCOUNTER
Patient is calling because her insurance will not cover the albuterol  They will cover flovent  She is asking if you can please send a new script with flovent to [facility] in [place]

## 2021-03-23 NOTE — TELEPHONE ENCOUNTER
Amy [place] is not the same medicine as albuterol So cannot be substituted    Ask them the substitute to ProAir or Ventolin or whenever they have

## 2021-03-23 NOTE — PATIENT INSTRUCTIONS
1  Call [facility]  gyn for your Pap test and discuss the fact that both her mother and her mother's sister had ovarian cancer    2  Please have your mammogram done    3  Please call the dentist for appointment    4  Please call Urology for an appointment because of the blood in her urine     5   Please ask her  to use half the bladder and not to mix any cheese in his dishes but only use a little on top  He could use although oil he wants    Recheck in 1 year sooner if needed

## 2021-03-24 NOTE — TELEPHONE ENCOUNTER
Patient informed, she will get ProAir (Generic) and it is a $30 copay , but patient said it not a problem

## 2021-03-30 ENCOUNTER — TELEPHONE (OUTPATIENT)
Dept: FAMILY MEDICINE CLINIC | Facility: CLINIC | Age: 56
End: 2021-03-30

## 2021-03-30 ENCOUNTER — TELEPHONE (OUTPATIENT)
Dept: UROLOGY | Facility: AMBULATORY SURGERY CENTER | Age: 56
End: 2021-03-30

## 2021-03-30 NOTE — TELEPHONE ENCOUNTER
Called Ishaan Ball back and scheduled her for 5/12 in [place] with Magnus narvaez   Had originally wanted to schedule on Thrusday - patient will be out of town

## 2021-03-30 NOTE — TELEPHONE ENCOUNTER
Patient said the inhaler sent on 3/23 is a RESCUE inhaler  She doesn't need a rescue inhaler  She says she needs a season inhaler  I don't know the difference

## 2021-03-30 NOTE — TELEPHONE ENCOUNTER
Please Triage - [address] Patient- ref in [facility] Leif Ram ([phone])    What is the reason for the patients appointment? Asymptomatic microscopic hematuria       Imaging/Lab Results:      Do we accept the patient's insurance or is the patient Self-Pay?   Provider & Plan: [facility] PERSONAL CHOICE  Member ID#: [id number]     Has the patient had any previous urologist(s)? no       Have patient records been requested? no       Has the patient had any outside testing done? no      Does the patient have a personal history of cancer? no      Patient can be reached at : [phone]

## 2021-03-30 NOTE — TELEPHONE ENCOUNTER
Patient needs Spring and Fall inhaler(s), not a rescue inhaler  Can you send a script to the [facility] in [place] in her chart      Best number for Laura Samaniego:  [phone]

## 2021-03-31 NOTE — TELEPHONE ENCOUNTER
Cynthia,    The mometasone inhaler is also the Asmanex inhaler  This is the preventive inhaler that is used 2 puffs twice a day  I assume this is what she means by this spring and fall inhaler    Albuterol inhaler is the rescue inhaler  This is used as needed for shortness of breath or wheezing    Both inhalers were sent in 3/23/2021

## 2021-04-06 DIAGNOSIS — J45.20 MILD INTERMITTENT ASTHMA WITHOUT COMPLICATION: Primary | ICD-10-CM

## 2021-04-06 DIAGNOSIS — J45.20 MILD INTERMITTENT ASTHMA WITHOUT COMPLICATION: ICD-10-CM

## 2021-04-06 RX ORDER — MOMETASONE FUROATE 100 UG/1
AEROSOL RESPIRATORY (INHALATION)
Qty: 13 INHALER | Refills: 5 | OUTPATIENT
Start: 2021-04-06

## 2021-04-06 RX ORDER — FLUTICASONE PROPIONATE 110 UG/1
2 AEROSOL, METERED RESPIRATORY (INHALATION) 2 TIMES DAILY
Qty: 1 INHALER | Refills: 5 | Status: SHIPPED | OUTPATIENT
Start: 2021-04-06 | End: 2021-09-24

## 2021-04-06 NOTE — TELEPHONE ENCOUNTER
The asmanex/mometasone furoate is NOT covered by insurance  Pt is requesting Flovent instead, said she has used that in the past and believes it is covered by her insurance  [facility] in [place] [phone]

## 2021-04-06 NOTE — TELEPHONE ENCOUNTER
Please call patient and tell her that I had to choose a different inhaler for her because her insurance company no longer will cover Asmanex   This new inhaler has Asmanex and  Take it the same way 2 puffs twice daily

## 2021-05-12 ENCOUNTER — CONSULT (OUTPATIENT)
Dept: UROLOGY | Facility: HOSPITAL | Age: 56
End: 2021-05-12
Payer: COMMERCIAL

## 2021-05-12 VITALS
DIASTOLIC BLOOD PRESSURE: 88 MMHG | BODY MASS INDEX: 35.68 KG/M2 | HEART RATE: 91 BPM | SYSTOLIC BLOOD PRESSURE: 128 MMHG | HEIGHT: 61 IN | WEIGHT: 189 LBS

## 2021-05-12 DIAGNOSIS — R31.21 ASYMPTOMATIC MICROSCOPIC HEMATURIA: ICD-10-CM

## 2021-05-12 LAB
BACTERIA UR QL AUTO: ABNORMAL /HPF
BILIRUB UR QL STRIP: NEGATIVE
CLARITY UR: CLEAR
COLOR UR: YELLOW
GLUCOSE UR STRIP-MCNC: NEGATIVE MG/DL
HGB UR QL STRIP.AUTO: ABNORMAL
HYALINE CASTS #/AREA URNS LPF: ABNORMAL /LPF
KETONES UR STRIP-MCNC: NEGATIVE MG/DL
LEUKOCYTE ESTERASE UR QL STRIP: ABNORMAL
NITRITE UR QL STRIP: NEGATIVE
NON-SQ EPI CELLS URNS QL MICRO: ABNORMAL /HPF
PH UR STRIP.AUTO: 6 [PH]
PROT UR STRIP-MCNC: NEGATIVE MG/DL
RBC #/AREA URNS AUTO: ABNORMAL /HPF
SL AMB  POCT GLUCOSE, UA: NORMAL
SL AMB LEUKOCYTE ESTERASE,UA: NORMAL
SL AMB POCT BILIRUBIN,UA: NORMAL
SL AMB POCT BLOOD,UA: NORMAL
SL AMB POCT CLARITY,UA: CLEAR
SL AMB POCT COLOR,UA: YELLOW
SL AMB POCT KETONES,UA: NORMAL
SL AMB POCT NITRITE,UA: NORMAL
SL AMB POCT PH,UA: 5
SL AMB POCT SPECIFIC GRAVITY,UA: 1.03
SL AMB POCT URINE PROTEIN: NORMAL
SL AMB POCT UROBILINOGEN: 0.2
SP GR UR STRIP.AUTO: 1.02 (ref 1–1.03)
UROBILINOGEN UR QL STRIP.AUTO: 0.2 E.U./DL
WBC #/AREA URNS AUTO: ABNORMAL /HPF

## 2021-05-12 PROCEDURE — 87086 URINE CULTURE/COLONY COUNT: CPT | Performed by: NURSE PRACTITIONER

## 2021-05-12 PROCEDURE — 81001 URINALYSIS AUTO W/SCOPE: CPT | Performed by: NURSE PRACTITIONER

## 2021-05-12 PROCEDURE — 81002 URINALYSIS NONAUTO W/O SCOPE: CPT | Performed by: NURSE PRACTITIONER

## 2021-05-12 PROCEDURE — 3008F BODY MASS INDEX DOCD: CPT | Performed by: NURSE PRACTITIONER

## 2021-05-12 PROCEDURE — 1036F TOBACCO NON-USER: CPT | Performed by: NURSE PRACTITIONER

## 2021-05-12 PROCEDURE — 99203 OFFICE O/P NEW LOW 30 MIN: CPT | Performed by: NURSE PRACTITIONER

## 2021-05-12 NOTE — PROGRESS NOTES
05/12/21    Monik Cabrera   1965   [id number]     Assessment  1 Microscopic hematuria    Discussion/Plan  1 Microscopic hematuria    CMP 3/3/21 BUN 12, Cr 0 63   Renal US ordered   Urine dip in office: trace leukocytes, trace protein, positive blood    Sent for microscopic analysis and culture    Encouraged adequate hydration with water    Cystoscopy recommended      Patient will obtain Renal US and schedule for cystoscopy with MD  Patient is agreeable to the plan and verbalizes understanding  All questions answered  We reviewed expectations of procedure at length  She will call with issues or concerns  Subjective  HPI   Monik Cabrera is a 54year old female who presents in consultation for microscopic hematuria  She is referred by her PCP  She is asymptomatic  She denies burning, dysuria, or pain  Denies fever, chills, incomplete emptying, incontinence, or gross hematuria  She has no family or personal history of nephrolithiasis, bladder or kidney cancer  She is a former smoker  She quit smoking cigarettes 30 years ago  She rarely consumes alcohol  She reports drinking 2-3 large bottles of water daily as well as coffee and tea  Previous urinalysis 3/3/21 revealed trace blood and microscopic showed 3-10 RBC  Leukocytes and WBC present as well  She endorses frequency, urgency and nocturia       Review of Systems - History obtained from chart review and the patient  General ROS: negative for - chills or fever  Psychological ROS: negative  Ophthalmic ROS: negative  ENT ROS: negative  Allergy and Immunology ROS: negative  Hematological and Lymphatic ROS: negative  Endocrine ROS: negative  Breast ROS: negative for breast lumps  Respiratory ROS: no cough, shortness of breath, or wheezing  Cardiovascular ROS: no chest pain or dyspnea on exertion  Gastrointestinal ROS: no abdominal pain, change in bowel habits, or black or bloody stools  Genito-Urinary ROS: positive for - nocturia and urinary frequency/urgency  Musculoskeletal ROS: negative  Neurological ROS: no TIA or stroke symptoms  Dermatological ROS: negative       Objective  Physical Exam  Constitutional:       General: She is not in acute distress  Appearance: Normal appearance  She is not ill-appearing, toxic-appearing or diaphoretic  HENT:      Head: Normocephalic and atraumatic  Neck:      Musculoskeletal: Normal range of motion  Pulmonary:      Effort: Pulmonary effort is normal  No respiratory distress  Musculoskeletal: Normal range of motion  Skin:     General: Skin is warm and dry  Neurological:      General: No focal deficit present  Mental Status: She is alert and oriented to person, place, and time  Psychiatric:         Mood and Affect: Mood normal          Behavior: Behavior normal          Thought Content:  Thought content normal          Judgment: Judgment normal              [facility] [place]

## 2021-05-13 LAB — BACTERIA UR CULT: NORMAL

## 2021-05-24 ENCOUNTER — HOSPITAL ENCOUNTER (OUTPATIENT)
Dept: ULTRASOUND IMAGING | Facility: CLINIC | Age: 56
Discharge: HOME | End: 2021-05-24
Payer: COMMERCIAL

## 2021-05-24 DIAGNOSIS — R31.21 ASYMPTOMATIC MICROSCOPIC HEMATURIA: ICD-10-CM

## 2021-05-24 PROCEDURE — 76770 US EXAM ABDO BACK WALL COMP: CPT

## 2021-06-01 ENCOUNTER — TELEPHONE (OUTPATIENT)
Dept: FAMILY MEDICINE CLINIC | Facility: CLINIC | Age: 56
End: 2021-06-01

## 2021-06-01 DIAGNOSIS — J45.20 MILD INTERMITTENT ASTHMA WITHOUT COMPLICATION: ICD-10-CM

## 2021-06-01 NOTE — RESULT ENCOUNTER NOTE
Call patient regarding her kidney ultrasound and bladder ultrasound   They are totally normal   She needs to see Urology now for a cystoscopy then her workup will be complete  I know she was working on getting an appointment with Urology

## 2021-06-01 NOTE — TELEPHONE ENCOUNTER
----- Message from Yessenia Newman DO sent at 5/31/2021  8:12 PM EDT -----  Call patient regarding her kidney ultrasound and bladder ultrasound   They are totally normal   She needs to see Urology now for a cystoscopy then her workup will be complete  I know she was working on getting an appointment with Urology  Left message to call back  Patient informed   She is working with the urology, she said they will going to send her to do a test

## 2021-06-01 NOTE — TELEPHONE ENCOUNTER
Please call patient  I have filled her Proventil inhaler 3 times in the last 5 months   I would like to know how often she is using it because if she needs it every day because she short of breath we need to do better on her maintenance medicine  If she is using it because she is walking every day and she takes it before she walks, then I understand

## 2021-06-02 RX ORDER — ALBUTEROL SULFATE 90 UG/1
2 AEROSOL, METERED RESPIRATORY (INHALATION) AS NEEDED
Qty: 18 G | Refills: 3 | Status: SHIPPED | OUTPATIENT
Start: 2021-06-02 | End: 2021-09-01

## 2021-06-02 NOTE — TELEPHONE ENCOUNTER
Please call her back and ask her to call us if she needs it other than before walking so that we know that it is time to adjust her meds  Thank you        Prescription with refill sent

## 2021-07-15 ENCOUNTER — PROCEDURE VISIT (OUTPATIENT)
Dept: UROLOGY | Facility: MEDICAL CENTER | Age: 56
End: 2021-07-15
Payer: COMMERCIAL

## 2021-07-15 VITALS
WEIGHT: 188 LBS | SYSTOLIC BLOOD PRESSURE: 144 MMHG | DIASTOLIC BLOOD PRESSURE: 80 MMHG | HEART RATE: 90 BPM | HEIGHT: 61 IN | BODY MASS INDEX: 35.5 KG/M2

## 2021-07-15 DIAGNOSIS — R31.21 ASYMPTOMATIC MICROSCOPIC HEMATURIA: Primary | ICD-10-CM

## 2021-07-15 LAB
SL AMB  POCT GLUCOSE, UA: NORMAL
SL AMB LEUKOCYTE ESTERASE,UA: NORMAL
SL AMB POCT BILIRUBIN,UA: NORMAL
SL AMB POCT BLOOD,UA: NORMAL
SL AMB POCT CLARITY,UA: CLEAR
SL AMB POCT COLOR,UA: YELLOW
SL AMB POCT KETONES,UA: NORMAL
SL AMB POCT NITRITE,UA: NORMAL
SL AMB POCT PH,UA: 5
SL AMB POCT SPECIFIC GRAVITY,UA: >=1.03
SL AMB POCT URINE PROTEIN: NORMAL
SL AMB POCT UROBILINOGEN: 0.2

## 2021-07-15 PROCEDURE — 52000 CYSTOURETHROSCOPY: CPT | Performed by: UROLOGY

## 2021-07-15 PROCEDURE — 1036F TOBACCO NON-USER: CPT | Performed by: UROLOGY

## 2021-07-15 PROCEDURE — 99213 OFFICE O/P EST LOW 20 MIN: CPT | Performed by: UROLOGY

## 2021-07-15 PROCEDURE — 3008F BODY MASS INDEX DOCD: CPT | Performed by: UROLOGY

## 2021-07-15 PROCEDURE — 81003 URINALYSIS AUTO W/O SCOPE: CPT | Performed by: UROLOGY

## 2021-07-15 NOTE — PROGRESS NOTES
HISTORY:    Further evaluation of asymptomatic microscopic hematuria  Between two and four red blood cells on microscopic UA  No symptoms whatsoever, good urinary stream and control, no urgency    Nonsmoker     Ultrasound was negative         ASSESSMENT / PLAN:    Cysto also normal     At this point we will consider her microscopic hematuria evaluation complete  Unless she would have significant symptoms, or more blood in the urine, does not need further evaluation    The following portions of the patient's history were reviewed and updated as appropriate: allergies, current medications, past family history, past medical history, past social history, past surgical history and problem list     Review of Systems   All other systems reviewed and are negative  Objective:     Physical Exam  Constitutional:       General: She is not in acute distress  Appearance: She is well-developed  She is not diaphoretic  HENT:      Head: Normocephalic and atraumatic  Eyes:      General: No scleral icterus  Pulmonary:      Effort: Pulmonary effort is normal    Skin:     Coloration: Skin is not pale  Neurological:      Mental Status: She is alert and oriented to person, place, and time  Psychiatric:         Behavior: Behavior normal          Thought Content: Thought content normal          Judgment: Judgment normal               Cystoscopy     Date/Time 7/15/2021 3:05 PM     Performed by  Deondre Swain MD     Authorized by Deondre Swain MD          Procedure Details:  Procedure type: cystoscopy    Patient tolerance: Patient tolerated the procedure well with no immediate complications    Additional Procedure Details:     Patient presents for cystoscopy  I described the procedure, answered any questions, and we discussed potential risks and complications  Patient expressed understanding, and signed an informed consent document    The patient was carefully placed in lithotomy position on the examining table   The urethra was prepped with sterile disinfectant  The 13 French flexible cystoscope was passed in the urethra with the following findings:    Urethra:  No stenosis although somewhat recessed     Bladder:  Smooth, no lesion tumor stones    Residual urine estimated to be minimal     The patient tolerated the procedure well and was escorted from the examining table  No results found for: PSA]  BUN   Date Value Ref Range Status   03/03/2021 12 6 - 24 mg/dL Final     Creatinine   Date Value Ref Range Status   03/03/2021 0 63 0 57 - 1 00 mg/dL Final   04/11/2016 0 54 (L) 0 57 - 1 00 mg/dL Final     No components found for: CBC      Patient Active Problem List   Diagnosis    Hypertension, essential    Hypercholesterolemia    Mild intermittent asthma without complication    Allergic rhinitis        Diagnoses and all orders for this visit:    Asymptomatic microscopic hematuria  -     POCT urine dip auto non-scope           Patient ID: Nia Phillips is a 64 y o  female        Current Outpatient Medications:     albuterol (PROVENTIL HFA,VENTOLIN HFA) 90 mcg/act inhaler, Inhale 2 puffs as needed for wheezing or shortness of breath And before exercise, Disp: 18 g, Rfl: 3    fluticasone (FLOVENT HFA) 110 MCG/ACT inhaler, Inhale 2 puffs 2 (two) times a day Rinse mouth after use , Disp: 1 Inhaler, Rfl: 5    Green Tea, Camillia sinensis, (GREEN TEA EXTRACT) 150 MG CAPS, Take 2 capsules by mouth daily, Disp: , Rfl:     LORazepam (ATIVAN) 0 5 mg tablet, Take 1 tablet (0 5 mg total) by mouth as needed for anxiety (30 minutes before her flight), Disp: 10 tablet, Rfl: 0    magnesium chloride-calcium (MAG-SR PLUS CALCIUM)  mg, Take by mouth, Disp: , Rfl:     mometasone (NASONEX) 50 mcg/act nasal spray, into each nostril, Disp: , Rfl:     Pediatric Multivit-Minerals-C (GUMMI BEAR MULTIVITAMIN/MIN) CHEW, Chew 2 tablets daily , Disp: , Rfl:     Plant Sterols and Stanols (CHOLEST OFF PO), Take by mouth, Disp: , Rfl:     sodium chloride (OCEAN NASAL SPRAY) 0 65 % nasal spray, 2 sprays into each nostril 2 (two) times a day, Disp: , Rfl:     Past Medical History:   Diagnosis Date    Nerve root and plexus disorder 10/01/2008       Past Surgical History:   Procedure Laterality Date     SECTION      x2    INCISION TENDON SHEATH      of a finger    NEUROMA EXCISION Right     thumb    NEUROPLASTY / TRANSPOSITION MEDIAN NERVE AT CARPAL TUNNEL         Social History

## 2021-09-01 DIAGNOSIS — J45.20 MILD INTERMITTENT ASTHMA WITHOUT COMPLICATION: ICD-10-CM

## 2021-09-01 RX ORDER — ALBUTEROL SULFATE 90 UG/1
2 AEROSOL, METERED RESPIRATORY (INHALATION) AS NEEDED
Qty: 6.7 G | Refills: 0 | Status: SHIPPED | OUTPATIENT
Start: 2021-09-01 | End: 2022-01-13 | Stop reason: SDUPTHER

## 2021-09-24 DIAGNOSIS — J45.20 MILD INTERMITTENT ASTHMA WITHOUT COMPLICATION: ICD-10-CM

## 2021-09-24 RX ORDER — DEXAMETHASONE 4 MG/1
TABLET ORAL
Qty: 12 G | Refills: 1 | Status: SHIPPED | OUTPATIENT
Start: 2021-09-24 | End: 2021-11-18

## 2021-11-11 ENCOUNTER — IMMUNIZATIONS (OUTPATIENT)
Dept: FAMILY MEDICINE CLINIC | Facility: CLINIC | Age: 56
End: 2021-11-11
Payer: COMMERCIAL

## 2021-11-11 DIAGNOSIS — Z23 ENCOUNTER FOR IMMUNIZATION: Primary | ICD-10-CM

## 2021-11-11 PROCEDURE — 90682 RIV4 VACC RECOMBINANT DNA IM: CPT

## 2021-11-11 PROCEDURE — 90471 IMMUNIZATION ADMIN: CPT

## 2021-11-18 DIAGNOSIS — J45.20 MILD INTERMITTENT ASTHMA WITHOUT COMPLICATION: ICD-10-CM

## 2021-11-18 RX ORDER — DEXAMETHASONE 4 MG/1
TABLET ORAL
Qty: 12 G | Refills: 1 | Status: SHIPPED | OUTPATIENT
Start: 2021-11-18 | End: 2022-01-16

## 2021-12-17 ENCOUNTER — OFFICE VISIT (OUTPATIENT)
Dept: FAMILY MEDICINE CLINIC | Facility: CLINIC | Age: 56
End: 2021-12-17
Payer: COMMERCIAL

## 2021-12-17 VITALS
WEIGHT: 191.7 LBS | DIASTOLIC BLOOD PRESSURE: 88 MMHG | RESPIRATION RATE: 16 BRPM | SYSTOLIC BLOOD PRESSURE: 140 MMHG | BODY MASS INDEX: 36.19 KG/M2 | HEART RATE: 82 BPM | HEIGHT: 61 IN

## 2021-12-17 DIAGNOSIS — S10.96XA TICK BITE OF NECK, INITIAL ENCOUNTER: ICD-10-CM
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
## 2021-12-17 PROCEDURE — 1036F TOBACCO NON-USER: CPT | Performed by: PHYSICIAN ASSISTANT

## 2021-12-17 PROCEDURE — 3725F SCREEN DEPRESSION PERFORMED: CPT | Performed by: PHYSICIAN ASSISTANT

## 2021-12-17 PROCEDURE — 3008F BODY MASS INDEX DOCD: CPT | Performed by: PHYSICIAN ASSISTANT

## 2021-12-17 PROCEDURE — 99213 OFFICE O/P EST LOW 20 MIN: CPT | Performed by: PHYSICIAN ASSISTANT

## 2021-12-22 LAB
B BURGDOR IGG PATRN SER IB-IMP: NEGATIVE
B BURGDOR IGG+IGM SER-ACNC: 1.4 ISR (ref 0–0.9)
B BURGDOR IGM PATRN SER IB-IMP: NEGATIVE
B BURGDOR IGM SER IA-ACNC: <0.8 INDEX (ref 0–0.79)
B BURGDOR18KD IGG SER QL IB: PRESENT
B BURGDOR23KD IGG SER QL IB: ABNORMAL
B BURGDOR23KD IGM SER QL IB: ABNORMAL
B BURGDOR28KD IGG SER QL IB: ABNORMAL
B BURGDOR30KD IGG SER QL IB: ABNORMAL
B BURGDOR39KD IGG SER QL IB: PRESENT
B BURGDOR39KD IGM SER QL IB: ABNORMAL
B BURGDOR41KD IGG SER QL IB: PRESENT
B BURGDOR41KD IGM SER QL IB: ABNORMAL
B BURGDOR45KD IGG SER QL IB: ABNORMAL
B BURGDOR58KD IGG SER QL IB: PRESENT
B BURGDOR66KD IGG SER QL IB: ABNORMAL
B BURGDOR93KD IGG SER QL IB: ABNORMAL

## 2022-01-13 DIAGNOSIS — J45.20 MILD INTERMITTENT ASTHMA WITHOUT COMPLICATION: ICD-10-CM

## 2022-01-13 RX ORDER — ALBUTEROL SULFATE 90 UG/1
2 AEROSOL, METERED RESPIRATORY (INHALATION) AS NEEDED
Qty: 6.7 G | Refills: 0 | Status: SHIPPED | OUTPATIENT
Start: 2022-01-13 | End: 2022-01-13

## 2022-01-13 RX ORDER — ALBUTEROL SULFATE 90 UG/1
2 AEROSOL, METERED RESPIRATORY (INHALATION) DAILY PRN
Qty: 6.7 G | Refills: 0 | Status: SHIPPED | OUTPATIENT
Start: 2022-01-13 | End: 2022-07-11 | Stop reason: SDUPTHER

## 2022-05-16 DIAGNOSIS — F40.243 FEAR OF FLYING: ICD-10-CM

## 2022-05-16 DIAGNOSIS — J45.20 MILD INTERMITTENT ASTHMA WITHOUT COMPLICATION: ICD-10-CM

## 2022-05-17 RX ORDER — LORAZEPAM 0.5 MG/1
0.5 TABLET ORAL AS NEEDED
Qty: 10 TABLET | Refills: 0 | OUTPATIENT
Start: 2022-05-17

## 2022-05-17 RX ORDER — DEXAMETHASONE 4 MG/1
TABLET ORAL
Qty: 36 G | Refills: 0 | OUTPATIENT
Start: 2022-05-17

## 2022-05-17 RX ORDER — ALBUTEROL SULFATE 90 UG/1
2 AEROSOL, METERED RESPIRATORY (INHALATION) DAILY PRN
Qty: 6.7 G | Refills: 0 | OUTPATIENT
Start: 2022-05-17

## 2022-05-17 NOTE — TELEPHONE ENCOUNTER
Patient already informed with last med refill that she needed to get her blood work done and make an appointment to see yes for 30 minutes because it has been so long   These will not be refilled until she makes an appointment and gets her blood work done

## 2022-06-28 LAB
25(OH)D3+25(OH)D2 SERPL-MCNC: 32.4 NG/ML (ref 30–100)
ALBUMIN SERPL-MCNC: 4.1 G/DL (ref 3.8–4.9)
ALBUMIN/GLOB SERPL: 1.9 {RATIO} (ref 1.2–2.2)
ALP SERPL-CCNC: 45 IU/L (ref 44–121)
ALT SERPL-CCNC: 19 IU/L (ref 0–32)
APPEARANCE UR: CLEAR
AST SERPL-CCNC: 17 IU/L (ref 0–40)
BACTERIA URNS QL MICRO: ABNORMAL
BASOPHILS # BLD AUTO: 0.1 X10E3/UL (ref 0–0.2)
BASOPHILS NFR BLD AUTO: 1 %
BILIRUB SERPL-MCNC: 0.3 MG/DL (ref 0–1.2)
BILIRUB UR QL STRIP: NEGATIVE
BUN SERPL-MCNC: 14 MG/DL (ref 6–24)
BUN/CREAT SERPL: 22 (ref 9–23)
CALCIUM SERPL-MCNC: 9.1 MG/DL (ref 8.7–10.2)
CASTS URNS QL MICRO: ABNORMAL /LPF
CHLORIDE SERPL-SCNC: 104 MMOL/L (ref 96–106)
CHOLEST SERPL-MCNC: 243 MG/DL (ref 100–199)
CO2 SERPL-SCNC: 25 MMOL/L (ref 20–29)
COLOR UR: YELLOW
CREAT SERPL-MCNC: 0.64 MG/DL (ref 0.57–1)
EGFR: 104 ML/MIN/1.73
EOSINOPHIL # BLD AUTO: 0.2 X10E3/UL (ref 0–0.4)
EOSINOPHIL NFR BLD AUTO: 3 %
EPI CELLS #/AREA URNS HPF: ABNORMAL /HPF (ref 0–10)
ERYTHROCYTE [DISTWIDTH] IN BLOOD BY AUTOMATED COUNT: 13.1 % (ref 11.7–15.4)
GLOBULIN SER-MCNC: 2.2 G/DL (ref 1.5–4.5)
GLUCOSE SERPL-MCNC: 92 MG/DL (ref 65–99)
GLUCOSE UR QL: NEGATIVE
HCT VFR BLD AUTO: 43.7 % (ref 34–46.6)
HDLC SERPL-MCNC: 41 MG/DL
HGB BLD-MCNC: 14.5 G/DL (ref 11.1–15.9)
HGB UR QL STRIP: NEGATIVE
IMM GRANULOCYTES # BLD: 0 X10E3/UL (ref 0–0.1)
IMM GRANULOCYTES NFR BLD: 0 %
KETONES UR QL STRIP: NEGATIVE
LDLC SERPL CALC-MCNC: 180 MG/DL (ref 0–99)
LEUKOCYTE ESTERASE UR QL STRIP: ABNORMAL
LYMPHOCYTES # BLD AUTO: 2.7 X10E3/UL (ref 0.7–3.1)
LYMPHOCYTES NFR BLD AUTO: 46 %
MCH RBC QN AUTO: 29.2 PG (ref 26.6–33)
MCHC RBC AUTO-ENTMCNC: 33.2 G/DL (ref 31.5–35.7)
MCV RBC AUTO: 88 FL (ref 79–97)
MICRO URNS: ABNORMAL
MONOCYTES # BLD AUTO: 0.4 X10E3/UL (ref 0.1–0.9)
MONOCYTES NFR BLD AUTO: 6 %
NEUTROPHILS # BLD AUTO: 2.5 X10E3/UL (ref 1.4–7)
NEUTROPHILS NFR BLD AUTO: 44 %
NITRITE UR QL STRIP: NEGATIVE
PH UR STRIP: 7.5 [PH] (ref 5–7.5)
PLATELET # BLD AUTO: 372 X10E3/UL (ref 150–450)
POTASSIUM SERPL-SCNC: 5 MMOL/L (ref 3.5–5.2)
PROT SERPL-MCNC: 6.3 G/DL (ref 6–8.5)
PROT UR QL STRIP: ABNORMAL
RBC # BLD AUTO: 4.96 X10E6/UL (ref 3.77–5.28)
RBC #/AREA URNS HPF: ABNORMAL /HPF (ref 0–2)
SL AMB VLDL CHOLESTEROL CALC: 22 MG/DL (ref 5–40)
SODIUM SERPL-SCNC: 141 MMOL/L (ref 134–144)
SP GR UR: 1.02 (ref 1–1.03)
TRIGL SERPL-MCNC: 119 MG/DL (ref 0–149)
TSH SERPL DL<=0.005 MIU/L-ACNC: 1.12 UIU/ML (ref 0.45–4.5)
UROBILINOGEN UR STRIP-ACNC: 0.2 MG/DL (ref 0.2–1)
WBC # BLD AUTO: 5.7 X10E3/UL (ref 3.4–10.8)
WBC #/AREA URNS HPF: ABNORMAL /HPF (ref 0–5)

## 2022-07-11 ENCOUNTER — OFFICE VISIT (OUTPATIENT)
Dept: FAMILY MEDICINE CLINIC | Facility: CLINIC | Age: 57
End: 2022-07-11
Payer: COMMERCIAL

## 2022-07-11 VITALS
RESPIRATION RATE: 18 BRPM | DIASTOLIC BLOOD PRESSURE: 84 MMHG | WEIGHT: 181 LBS | BODY MASS INDEX: 34.17 KG/M2 | HEART RATE: 80 BPM | SYSTOLIC BLOOD PRESSURE: 133 MMHG | OXYGEN SATURATION: 98 % | HEIGHT: 61 IN

## 2022-07-11 DIAGNOSIS — I10 HYPERTENSION, ESSENTIAL: ICD-10-CM

## 2022-07-11 DIAGNOSIS — F40.243 FEAR OF FLYING: ICD-10-CM

## 2022-07-11 DIAGNOSIS — J30.1 SEASONAL ALLERGIC RHINITIS DUE TO POLLEN: ICD-10-CM

## 2022-07-11 DIAGNOSIS — E78.00 HYPERCHOLESTEROLEMIA: ICD-10-CM

## 2022-07-11 DIAGNOSIS — J45.20 MILD INTERMITTENT ASTHMA WITHOUT COMPLICATION: ICD-10-CM

## 2022-07-11 DIAGNOSIS — Z00.00 ROUTINE GENERAL MEDICAL EXAMINATION AT A HEALTH CARE FACILITY: Primary | ICD-10-CM

## 2022-07-11 DIAGNOSIS — Z12.31 ENCOUNTER FOR SCREENING MAMMOGRAM FOR MALIGNANT NEOPLASM OF BREAST: ICD-10-CM

## 2022-07-11 PROCEDURE — 99396 PREV VISIT EST AGE 40-64: CPT | Performed by: FAMILY MEDICINE

## 2022-07-11 PROCEDURE — 99214 OFFICE O/P EST MOD 30 MIN: CPT | Performed by: FAMILY MEDICINE

## 2022-07-11 PROCEDURE — 3725F SCREEN DEPRESSION PERFORMED: CPT | Performed by: FAMILY MEDICINE

## 2022-07-11 RX ORDER — FLUTICASONE PROPIONATE 110 UG/1
2 AEROSOL, METERED RESPIRATORY (INHALATION) DAILY
Qty: 36 G | Refills: 3 | Status: SHIPPED | OUTPATIENT
Start: 2022-07-11 | End: 2022-07-11 | Stop reason: SDUPTHER

## 2022-07-11 RX ORDER — ALBUTEROL SULFATE 90 UG/1
2 AEROSOL, METERED RESPIRATORY (INHALATION) DAILY PRN
Qty: 6.7 G | Refills: 3 | Status: SHIPPED | OUTPATIENT
Start: 2022-07-11 | End: 2022-07-11 | Stop reason: SDUPTHER

## 2022-07-11 RX ORDER — LORAZEPAM 0.5 MG/1
0.5 TABLET ORAL AS NEEDED
Qty: 30 TABLET | Refills: 0 | Status: SHIPPED | OUTPATIENT
Start: 2022-07-11

## 2022-07-11 RX ORDER — FLUTICASONE PROPIONATE 110 UG/1
2 AEROSOL, METERED RESPIRATORY (INHALATION) DAILY
Qty: 36 G | Refills: 3 | Status: SHIPPED | OUTPATIENT
Start: 2022-07-11

## 2022-07-11 RX ORDER — MOMETASONE FUROATE 50 UG/1
2 SPRAY, METERED NASAL DAILY
Qty: 51 G | Refills: 3 | Status: SHIPPED | OUTPATIENT
Start: 2022-07-11 | End: 2022-07-11 | Stop reason: SDUPTHER

## 2022-07-11 RX ORDER — ALBUTEROL SULFATE 90 UG/1
2 AEROSOL, METERED RESPIRATORY (INHALATION) DAILY PRN
Qty: 6.7 G | Refills: 3 | Status: SHIPPED | OUTPATIENT
Start: 2022-07-11

## 2022-07-11 RX ORDER — MOMETASONE FUROATE 50 UG/1
2 SPRAY, METERED NASAL DAILY
Qty: 51 G | Refills: 3 | Status: SHIPPED | OUTPATIENT
Start: 2022-07-11

## 2022-07-11 NOTE — PROGRESS NOTES
SUBJECTIVE:-------------------------------------------------------------------------------------------    Opal Montilla is a 64 y o   female and is here for routine health maintenance  Health Maintenance   Topic Date Due    Pneumococcal Vaccine: Pediatrics (0 to 5 Years) and At-Risk Patients (6 to 59 Years) (1 - PCV) Never done    Colorectal Cancer Screening  03/13/2018    Cervical Cancer Screening  03/15/2018    Breast Cancer Screening: Mammogram  11/21/2018    COVID-19 Vaccine (2 - Mixed Product series) 04/17/2021    Annual Physical  03/23/2022    Influenza Vaccine (1) 09/01/2022    BMI: Followup Plan  12/17/2022    HIV Screening  12/18/2023 (Originally 7/13/1980)    Hepatitis C Screening  01/17/2024 (Originally 1965)    Depression Screening  07/11/2023    BMI: Adult  07/11/2023    DTaP,Tdap,and Td Vaccines (3 - Td or Tdap) 02/01/2028    HIB Vaccine  Aged Out    Hepatitis B Vaccine  Aged Out    IPV Vaccine  Aged Out    Hepatitis A Vaccine  Aged Out    Meningococcal ACWY Vaccine  Aged Out    HPV Vaccine  Aged Out     Immunization History   Administered Date(s) Administered    COVID-19, unspecified 03/20/2021    INFLUENZA 10/23/2008, 11/02/2009, 01/18/2016, 11/13/2017    Influenza Quadrivalent, 6-35 Months IM 10/24/2014, 01/18/2016, 11/13/2017    Influenza Split 10/23/2008, 11/02/2009    Influenza, recombinant, quadrivalent,injectable, preservative free 11/11/2021    Influenza, seasonal, injectable 01/08/2013    Tdap 06/21/2011, 02/01/2018         Diet and Physical Activity  Diet: poor diet  Body mass index is 34 2 kg/m²  Exercise: infrequently      General Health  Hearing:  Is normal  Vision: sees ophthalmologist/optometrist yearly  Dental:  Sees dentist every 6 months      Smoker no        ASSESSMENT/PLAN:-------------------------------------------------------------------------------------------    Patient's physical is up-to-date   Immunizations ;   Pt will wait  Cancer screenings; needs mammo      Patient instructed on exercise  Patient instructed on healthy choices for diet       NEXT PHYSICAL 1 YEAR          The following portions of the patient's history were reviewed and updated as appropriate: allergies, current medications, past family history, past medical history, past social history, past surgical history and problem list       OBJECTIVE:---------------------------------------------------------------------------------------------------    /84   Pulse 80   Resp 18   Ht 5' 1" (1 549 m)   Wt 82 1 kg (181 lb)   SpO2 98%   BMI 34 20 kg/m²   Wt Readings from Last 3 Encounters:   07/11/22 82 1 kg (181 lb)   12/17/21 87 kg (191 lb 11 2 oz)   07/15/21 85 3 kg (188 lb)     BP Readings from Last 3 Encounters:   07/11/22 133/84   12/17/21 140/88   07/15/21 144/80     Pulse Readings from Last 3 Encounters:   07/11/22 80   12/17/21 82   07/15/21 90     Body mass index is 34 2 kg/m²  Health Maintenance   Topic Date Due    Pneumococcal Vaccine: Pediatrics (0 to 5 Years) and At-Risk Patients (6 to 59 Years) (1 - PCV) Never done    Colorectal Cancer Screening  03/13/2018    Cervical Cancer Screening  03/15/2018    Breast Cancer Screening: Mammogram  11/21/2018    COVID-19 Vaccine (2 - Mixed Product series) 04/17/2021    Annual Physical  03/23/2022    Influenza Vaccine (1) 09/01/2022    BMI: Followup Plan  12/17/2022    HIV Screening  12/18/2023 (Originally 7/13/1980)    Hepatitis C Screening  01/17/2024 (Originally 1965)    Depression Screening  07/11/2023    BMI: Adult  07/11/2023    DTaP,Tdap,and Td Vaccines (3 - Td or Tdap) 02/01/2028    HIB Vaccine  Aged Out    Hepatitis B Vaccine  Aged Out    IPV Vaccine  Aged Out    Hepatitis A Vaccine  Aged Out    Meningococcal ACWY Vaccine  Aged Out    HPV Vaccine  Aged Out       ROS:   12 point review of systems negative            PHYSICAL EXAM:    Gen    No acute distress well-appearing well-nourished appears stated age    Mental status  Good judgment and insight oriented to time person and place, recent and remote memory intact mood and affect normal cooperative and patient is reasonable    HEENT  PERRLA 3 mm, EOMI without nystagmus, TMs clear, turbinates open pink no exudate, pharynx benign, tongue midline    Neck   supple no masses trachea midline positive click normal carotid upstrokes with no bruits    Cor  Regular rhythm without ectopy or murmur, no S3-S4, normal palpation that is no heave lift or thrill    Vascular  No edema, good pedal pulses    Lungs  CTA bilaterally in no respiratory distress no wheezes rhonchi or rales, normal to palpation no tactile fremitus    Abdomen  Soft, no palpable masses, no hepatosplenomegaly, normal bowel sounds, nontender    Lymphatics  No palpable nodes in the neck, supraclavicular area, axilla, or groin     Musculoskeletal  No clubbing cyanosis or edema muscle tone normal 12 point review of systems is negative    Skin  no rashes or abnormal appearing lesions    Neuro  Normal ambulation, cranial nerves 2-12 grossly intact, higher functioning with reasoning intact

## 2022-07-11 NOTE — PATIENT INSTRUCTIONS
Good luck in your early prison!     Try to find somebody in both places to help you and in the meantime I will do the best I can to help you until you get established

## 2022-07-11 NOTE — PROGRESS NOTES
ASSESSMENT/PLAN:    Hypertension   Remains borderline again which it always is when she is here  Usually normal at home  133/84   Continue low-salt diet neck is ir size   Now because she has been moving between here and past [place] she has been drinking a lot of caffeine and eating candy bars and not really eating well at all  When she goes back on her usual routine she should start losing weight and get her blood pressure back under control     Hematuria   Workup last year was negative    Hyperlipidemia  Cholesterol up to 243 from 222 and  from 158   Again because of traveling  This should right itself when she eats well     Mild asthma  Uses Ventolin p r n  Walking  Flovent daily in the fall until the 1st killing spring  Prescriptions given     Seasonal allergies  Uses Flonase over the counter     Fear of flying  New prescription for lorazepam to be used as needed for flying  30 pills given    Patient knows that when she establishes and area she will need to see a gyn  Her colonoscopy is up-to-date  She also needs to see a dentist    Recheck p r n  otherwise patient is moving out of the area         BMI Counseling: Body mass index is 34 2 kg/m²  The BMI is above normal  Nutrition recommendations include decreasing portion sizes and encouraging healthy choices of fruits and vegetables  Exercise recommendations include moderate physical activity 150 minutes/week  Rationale for BMI follow-up plan is due to patient being overweight or obese  Depression Screening and Follow-up Plan: Patient was screened for depression during today's encounter  They screened negative with a PHQ-2 score of 0             Health Maintenance   Topic Date Due    Pneumococcal Vaccine: Pediatrics (0 to 5 Years) and At-Risk Patients (6 to 59 Years) (1 - PCV) Never done    Colorectal Cancer Screening  03/13/2018    Cervical Cancer Screening  03/15/2018    Breast Cancer Screening: Mammogram  11/21/2018    COVID-19 Vaccine (2 - Mixed Product series) 04/17/2021    Annual Physical  03/23/2022    Influenza Vaccine (1) 09/01/2022    BMI: Followup Plan  12/17/2022    HIV Screening  12/18/2023 (Originally 7/13/1980)    Hepatitis C Screening  01/17/2024 (Originally 1965)    Depression Screening  07/11/2023    BMI: Adult  07/11/2023    DTaP,Tdap,and Td Vaccines (3 - Td or Tdap) 02/01/2028    HIB Vaccine  Aged Out    Hepatitis B Vaccine  Aged Out    IPV Vaccine  Aged Out    Hepatitis A Vaccine  Aged Out    Meningococcal ACWY Vaccine  Aged Out    HPV Vaccine  Aged Out         Problem List as of 7/11/2022 Reviewed: 3/23/2021  8:53 AM by Marlon Carolina,     Allergic rhinitis    Hypercholesterolemia    Hypertension, essential    Mild intermittent asthma without complication            Subjective:   Chief Complaint   Patient presents with    Hypertension    Asthma     Patient is here with new said she is leaving this area and [place] the [place]     She did get her eye exam done since last year but not much else     She did get blood work done for us     Overall she feels well with no complaints and basically would just like her prescriptions filled so that she can take some time to find people 1 she gets physically moved into her 2 homes  patient ID: Josy Knott is a 64 y o  female  Patient's past medical history, surgical history, family history, social history, and Tobacco history reviewed with patient       MED LIST WAS REVIEWED AND UPDATED    ROS  As per HPI  Rest of 12 point review of systems negative     Objective:      VITALS:  Wt Readings from Last 3 Encounters:   07/11/22 82 1 kg (181 lb)   12/17/21 87 kg (191 lb 11 2 oz)   07/15/21 85 3 kg (188 lb)     BP Readings from Last 3 Encounters:   07/11/22 133/84   12/17/21 140/88   07/15/21 144/80     Pulse Readings from Last 3 Encounters:   07/11/22 80   12/17/21 82   07/15/21 90     Body mass index is 34 2 kg/m²  Laboratory Results: All pertinent labs and studies were reviewed with patient during this office visit with highlights of the results contained in this note in the ASSESSMENT AND PLAN section       Physical Exam      Gen  No acute distress well-appearing well-nourished appears stated age    Mental status  Good judgment and insight oriented to time person and place, recent and remote memory intact mood and affect normal cooperative and patient is reasonable    HEENT  PERRLA 3 mm, EOMI without nystagmus, normocephalic atraumatic without facial weakness      Neck   supple no masses trachea midline positive click normal carotid upstrokes with no bruits    Cor  Regular rhythm without ectopy or murmur, no S3-S4, normal palpation that is no heave lift or thrill    Vascular  No edema, good pedal pulses    Lungs  CTA bilaterally in no respiratory distress no wheezes rhonchi or rales, normal to palpation no tactile fremitus    Abdomen  Soft, no palpable masses, no hepatosplenomegaly, normal bowel sounds, nontender    Lymphatics  No palpable nodes in the neck, supraclavicular area, axilla, or groin     Musculoskeletal  No clubbing cyanosis or edema muscle tone normal    Skin  no rashes or abnormal appearing lesions    Neuro  Normal ambulation, cranial nerves 2-12 grossly intact, higher functioning with reasoning intact

## 2022-07-12 ENCOUNTER — TELEPHONE (OUTPATIENT)
Dept: FAMILY MEDICINE CLINIC | Facility: CLINIC | Age: 57
End: 2022-07-12

## 2022-07-13 NOTE — TELEPHONE ENCOUNTER
07/13/22 12:47 PM        Thank you for your request  Your request has been received, reviewed, and the patient chart updated  The PCP has successfully been removed with a patient attribution note  This message will now be completed          Thank you  Rory Baum

## 2024-05-02 NOTE — TELEPHONE ENCOUNTER
Patient is moving out of the area and will no longer be Dr Gauthier Head patient please have her name removed from  [place]  Patient will get another Doctor outside of [facility]  DISPLAY PLAN FREE TEXT DISPLAY PLAN FREE TEXT DISPLAY PLAN FREE TEXT DISPLAY PLAN FREE TEXT DISPLAY PLAN FREE TEXT DISPLAY PLAN FREE TEXT DISPLAY PLAN FREE TEXT DISPLAY PLAN FREE TEXT DISPLAY PLAN FREE TEXT